# Patient Record
Sex: MALE | URBAN - METROPOLITAN AREA
[De-identification: names, ages, dates, MRNs, and addresses within clinical notes are randomized per-mention and may not be internally consistent; named-entity substitution may affect disease eponyms.]

---

## 2018-02-05 ENCOUNTER — OFFICE VISIT (OUTPATIENT)
Dept: INTERNAL MEDICINE | Facility: CLINIC | Age: 23
End: 2018-02-05

## 2018-02-05 VITALS
WEIGHT: 154 LBS | OXYGEN SATURATION: 91 % | TEMPERATURE: 97.6 F | HEART RATE: 98 BPM | SYSTOLIC BLOOD PRESSURE: 140 MMHG | HEIGHT: 66 IN | DIASTOLIC BLOOD PRESSURE: 80 MMHG | BODY MASS INDEX: 24.75 KG/M2

## 2018-02-05 DIAGNOSIS — Z48.02 ENCOUNTER FOR REMOVAL OF SUTURES: Primary | ICD-10-CM

## 2018-02-05 PROCEDURE — 99202 OFFICE O/P NEW SF 15 MIN: CPT | Performed by: NURSE PRACTITIONER

## 2018-02-05 NOTE — MR AVS SNAPSHOT
"              After Visit Summary   2018    Garett Briscoe    MRN: [id number]           Patient Information     Date Of Birth          1995        Visit Information        Provider Department      2018 8:00 AM Avelina Melgar NP [facility]        Today's Diagnoses     Encounter for removal of sutures    -  1       Follow-ups after your visit        Who to contact     If you have questions or need follow up information about today's clinic visit or your schedule please contact [facility] directly at [phone].  Normal or non-critical lab and imaging results will be communicated to you by MyChart, letter or phone within 4 business days after the clinic has received the results. If you do not hear from us within 7 days, please contact the clinic through [employer] or phone. If you have a critical or abnormal lab result, we will notify you by phone as soon as possible.  Submit refill requests through [employer] or call your pharmacy and they will forward the refill request to us. Please allow 3 business days for your refill to be completed.          Additional Information About Your Visit        MyChart Information     [employer] lets you send messages to your doctor, view your test results, renew your prescriptions, schedule appointments and more. To sign up, go to [email or web address] . Click on \"Log in\" on the left side of the screen, which will take you to the Welcome page. Then click on \"Sign up Now\" on the right side of the page.     You will be asked to enter the access code listed below, as well as some personal information. Please follow the directions to create your username and password.     Your access code is: [email or web address]  Expires: 2018  8:54 AM     Your access code will  in 90 days. If you need help or a new code, please call your [facility] or [phone].        Care EveryWhere ID     This is your Care EveryWhere ID. This could be used by " "other organizations to access your [place] medical records  [id number]        Your Vitals Were     Pulse Temperature Height Pulse Oximetry BMI (Body Mass Index)       98 97.6  F (36.4  C) (Oral) 5' 6\" (1.676 m) 91% 24.86 kg/m2        Blood Pressure from Last 3 Encounters:   02/05/18 140/80    Weight from Last 3 Encounters:   02/05/18 154 lb (69.9 kg)              Today, you had the following     No orders found for display       Primary Care Provider Fax #    Physician No Ref-Primary [phone]       No address on file        Equal Access to Services     [facility] : Hadii wyatt quinteros Soamanda, waaxda luqadaha, qaybta kaalmada maria esther, andi mckenzie . So [facility] [phone].    ATENCIÓN: Si habla español, tiene a sandoval disposición servicios gratuitos de asistencia lingüística. Llame al [phone].    We comply with applicable federal civil rights laws and [place] laws. We do not discriminate on the basis of race, color, national origin, age, disability, sex, sexual orientation, or gender identity.            Thank you!     Thank you for choosing [facility]  for your care. Our goal is always to provide you with excellent care. Hearing back from our patients is one way we can continue to improve our services. Please take a few minutes to complete the written survey that you may receive in the mail after your visit with us. Thank you!             Your Updated Medication List - Protect others around you: Learn how to safely use, store and throw away your medicines at www.disposemymeds.org.      Notice  As of 2/5/2018  8:54 AM    You have not been prescribed any medications.      "

## 2018-02-05 NOTE — PROGRESS NOTES
"  SUBJECTIVE:   Garett Briscoe is a 22 year old male who presents to clinic today for the following health issues:      ED/UC Followup:    Facility:  [facility]  Date of visit: 1/25/18  Reason for visit: laceration  Current Status: L hand laceration, here for suture removal, no redness, drainage.  Did have Tdap.             Problem list and histories reviewed & adjusted, as indicated.  Additional history: as documented        Reviewed and updated as needed this visit by clinical staff  Tobacco  Allergies  Meds  Med Hx  Surg Hx  Fam Hx  Soc Hx      Reviewed and updated as needed this visit by Provider         ROS:  Constitutional, HEENT, cardiovascular, pulmonary, gi and gu systems are negative, except as otherwise noted.    OBJECTIVE:     /80  Pulse 98  Temp 97.6  F (36.4  C) (Oral)  Ht 5' 6\" (1.676 m)  Wt 154 lb (69.9 kg)  SpO2 91%  BMI 24.86 kg/m2  Body mass index is 24.86 kg/(m^2).  GENERAL: healthy, alert and no distress  SKIN: L hand thenar area, well approximated laceration w/o signs infection, 3 sutures removed        ASSESSMENT/PLAN:               ICD-10-CM    1. Encounter for removal of sutures Z48.02        Steri strips applied  F/u prn    Avelina Melgar NP  [facility]    "

## 2018-02-05 NOTE — NURSING NOTE
"Chief Complaint   Patient presents with     Suture Removal   Lac left hand , sutures in at [place] - Tdap given     Initial /80  Pulse 98  Temp 97.6  F (36.4  C) (Oral)  Ht 5' 6\" (1.676 m)  Wt 154 lb (69.9 kg)  SpO2 91%  BMI 24.86 kg/m2 Estimated body mass index is 24.86 kg/(m^2) as calculated from the following:    Height as of this encounter: 5' 6\" (1.676 m).    Weight as of this encounter: 154 lb (69.9 kg).  Medication Reconciliation: complete    "